# Patient Record
Sex: MALE | Race: WHITE | NOT HISPANIC OR LATINO | Employment: FULL TIME | URBAN - METROPOLITAN AREA
[De-identification: names, ages, dates, MRNs, and addresses within clinical notes are randomized per-mention and may not be internally consistent; named-entity substitution may affect disease eponyms.]

---

## 2019-07-24 ENCOUNTER — OFFICE VISIT (OUTPATIENT)
Dept: URGENT CARE | Facility: CLINIC | Age: 44
End: 2019-07-24
Payer: COMMERCIAL

## 2019-07-24 VITALS
RESPIRATION RATE: 18 BRPM | TEMPERATURE: 98.3 F | OXYGEN SATURATION: 97 % | DIASTOLIC BLOOD PRESSURE: 90 MMHG | SYSTOLIC BLOOD PRESSURE: 161 MMHG | HEIGHT: 72 IN | WEIGHT: 195 LBS | BODY MASS INDEX: 26.41 KG/M2 | HEART RATE: 78 BPM

## 2019-07-24 DIAGNOSIS — K61.1 PERIRECTAL ABSCESS: Primary | ICD-10-CM

## 2019-07-24 PROCEDURE — 99203 OFFICE O/P NEW LOW 30 MIN: CPT | Performed by: PHYSICIAN ASSISTANT

## 2019-07-24 PROCEDURE — S9088 SERVICES PROVIDED IN URGENT: HCPCS | Performed by: PHYSICIAN ASSISTANT

## 2019-07-24 RX ORDER — LEVOFLOXACIN 750 MG/1
750 TABLET ORAL EVERY 24 HOURS
Qty: 7 TABLET | Refills: 0 | Status: SHIPPED | OUTPATIENT
Start: 2019-07-24 | End: 2019-07-31

## 2019-07-24 NOTE — PROGRESS NOTES
[id number]        NAME: Elizabeth Ames is a 44 y o  male  : 1975    MRN: [id number]  DATE: 2019  TIME: 11:13 AM    Assessment and Plan   Perirectal abscess [K61 1]  1  Perirectal abscess  levofloxacin (LEVAQUIN) 750 mg tablet    Ambulatory referral to Colorectal Surgery         Patient Instructions     Discussed with patient that boils are extensive and will require surgical evaluation  Will start on antibiotic and recommend referral to colorectal surgery  ED if any fever, N/V,    Chief Complaint     Chief Complaint   Patient presents with    Abscess     abscess on eight buttock for 2 weeks and on left not drining         History of Present Illness       43 y/o M presents for eval of abscess on L and R buttock  Abscess on R has been presents for about 1 month and recently started draining    Abscess on L began about 3-4 days ago  Pt states he sits at work all day and this exacerbates symptoms  He denies any fever, chills, N/V  States for the past 2 years he has gotten multiple boils but they always resolved on their own  Review of Systems   Review of Systems   All other systems reviewed and are negative  Current Medications       Current Outpatient Medications:     levofloxacin (LEVAQUIN) 750 mg tablet, Take 1 tablet (750 mg total) by mouth every 24 hours for 7 days, Disp: 7 tablet, Rfl: 0    Current Allergies     Allergies as of 2019 - never reviewed   Allergen Reaction Noted    Penicillins Hives 2019            The following portions of the patient's history were reviewed and updated as appropriate: allergies, current medications, past family history, past medical history, past social history, past surgical history and problem list      History reviewed  No pertinent past medical history  History reviewed  No pertinent surgical history  No family history on file  Medications have been verified          Objective   /90   Pulse 78   Temp 98 3 °F (36 8 °C) (Tympanic)   Resp 18   Ht 6' (1 829 m)   Wt 88 5 kg (195 lb)   SpO2 97%   BMI 26 45 kg/m²        Physical Exam     Physical Exam   Constitutional: He appears well-developed and well-nourished  No distress  HENT:   Head: Normocephalic and atraumatic  Cardiovascular: Normal rate, regular rhythm and intact distal pulses  Exam reveals no gallop and no friction rub  No murmur heard  Pulmonary/Chest: Effort normal and breath sounds normal  No respiratory distress  He has no wheezes  He has no rales  Skin:        Psychiatric: He has a normal mood and affect